# Patient Record
Sex: FEMALE | Race: BLACK OR AFRICAN AMERICAN | NOT HISPANIC OR LATINO | Employment: FULL TIME | URBAN - METROPOLITAN AREA
[De-identification: names, ages, dates, MRNs, and addresses within clinical notes are randomized per-mention and may not be internally consistent; named-entity substitution may affect disease eponyms.]

---

## 2021-12-03 ENCOUNTER — PRE-ADMISSION TESTING (OUTPATIENT)
Dept: PREADMISSION TESTING | Facility: HOSPITAL | Age: 41
End: 2021-12-03

## 2021-12-03 VITALS
TEMPERATURE: 98.1 F | HEART RATE: 83 BPM | RESPIRATION RATE: 16 BRPM | SYSTOLIC BLOOD PRESSURE: 109 MMHG | BODY MASS INDEX: 36.44 KG/M2 | WEIGHT: 198 LBS | DIASTOLIC BLOOD PRESSURE: 72 MMHG | HEIGHT: 62 IN | OXYGEN SATURATION: 99 %

## 2021-12-03 LAB
DEPRECATED RDW RBC AUTO: 34.7 FL (ref 37–54)
ERYTHROCYTE [DISTWIDTH] IN BLOOD BY AUTOMATED COUNT: 14.4 % (ref 12.3–15.4)
HCG SERPL QL: NEGATIVE
HCT VFR BLD AUTO: 38.3 % (ref 34–46.6)
HGB BLD-MCNC: 11.9 G/DL (ref 12–15.9)
MCH RBC QN AUTO: 21.8 PG (ref 26.6–33)
MCHC RBC AUTO-ENTMCNC: 31.1 G/DL (ref 31.5–35.7)
MCV RBC AUTO: 70.1 FL (ref 79–97)
PLATELET # BLD AUTO: 489 10*3/MM3 (ref 140–450)
PMV BLD AUTO: 9.3 FL (ref 6–12)
RBC # BLD AUTO: 5.46 10*6/MM3 (ref 3.77–5.28)
WBC NRBC COR # BLD: 7.58 10*3/MM3 (ref 3.4–10.8)

## 2021-12-03 PROCEDURE — 84703 CHORIONIC GONADOTROPIN ASSAY: CPT

## 2021-12-03 PROCEDURE — 85027 COMPLETE CBC AUTOMATED: CPT

## 2021-12-03 PROCEDURE — 36415 COLL VENOUS BLD VENIPUNCTURE: CPT

## 2021-12-03 NOTE — DISCHARGE INSTRUCTIONS
Take the following medications the morning of surgery:    NONE      ARRIVE AT 1:00      If you are on prescription narcotic pain medication to control your pain you may also take that medication the morning of surgery.    General Instructions:  • Do not eat solid food after midnight the night before surgery.  • You may drink clear liquids day of surgery but must stop at least one hour before your hospital arrival time.  • It is beneficial for you to have a clear drink that contains carbohydrates the day of surgery.  We suggest a 12 to 20 ounce bottle of Gatorade or Powerade for non-diabetic patients or a 12 to 20 ounce bottle of G2 or Powerade Zero for diabetic patients. (Pediatric patients, are not advised to drink a 12 to 20 ounce carbohydrate drink)    Clear liquids are liquids you can see through.  Nothing red in color.     Plain water                               Sports drinks  Sodas                                   Gelatin (Jell-O)  Fruit juices without pulp such as white grape juice and apple juice  Popsicles that contain no fruit or yogurt  Tea or coffee (no cream or milk added)  Gatorade / Powerade  G2 / Powerade Zero    • Infants may have breast milk up to four hours before surgery.  • Infants drinking formula may drink formula up to six hours before surgery.   • Patients who avoid smoking, chewing tobacco and alcohol for 4 weeks prior to surgery have a reduced risk of post-operative complications.  Quit smoking as many days before surgery as you can.  • Do not smoke, use chewing tobacco or drink alcohol the day of surgery.   • If applicable bring your C-PAP/ BI-PAP machine.  • Bring any papers given to you in the doctor’s office.  • Wear clean comfortable clothes.  • Do not wear contact lenses, false eyelashes or make-up.  Bring a case for your glasses.   • Bring crutches or walker if applicable.  • Remove all piercings.  Leave jewelry and any other valuables at home.  • Hair extensions with metal clips  must be removed prior to surgery.  • The Pre-Admission Testing nurse will instruct you to bring medications if unable to obtain an accurate list in Pre-Admission Testing.            Preventing a Surgical Site Infection:  • For 2 to 3 days before surgery, avoid shaving with a razor because the razor can irritate skin and make it easier to develop an infection.    • Any areas of open skin can increase the risk of a post-operative wound infection by allowing bacteria to enter and travel throughout the body.  Notify your surgeon if you have any skin wounds / rashes even if it is not near the expected surgical site.  The area will need assessed to determine if surgery should be delayed until it is healed.  • The night prior to surgery shower using a fresh bar of anti-bacterial soap (such as Dial) and clean washcloth.  Sleep in a clean bed with clean clothing.  Do not allow pets to sleep with you.  • Shower on the morning of surgery using a fresh bar of anti-bacterial soap (such as Dial) and clean washcloth.  Dry with a clean towel and dress in clean clothing.  • Ask your surgeon if you will be receiving antibiotics prior to surgery.  • Make sure you, your family, and all healthcare providers clean their hands with soap and water or an alcohol based hand  before caring for you or your wound.    Day of surgery:  Your arrival time is approximately two hours before your scheduled surgery time.  Upon arrival, a Pre-op nurse and Anesthesiologist will review your health history, obtain vital signs, and answer questions you may have.  The only belongings needed at this time will be a list of your home medications and if applicable your C-PAP/BI-PAP machine.  A Pre-op nurse will start an IV and you may receive medication in preparation for surgery, including something to help you relax.     Please be aware that surgery does come with discomfort.  We want to make every effort to control your discomfort so please discuss  any uncontrolled symptoms with your nurse.   Your doctor will most likely have prescribed pain medications.      If you are going home after surgery you will receive individualized written care instructions before being discharged.  A responsible adult must drive you to and from the hospital on the day of your surgery and stay with you for 24 hours.  Discharge prescriptions can be filled by the hospital pharmacy during regular pharmacy hours.  If you are having surgery late in the day/evening your prescription may be e-prescribed to your pharmacy.  Please verify your pharmacy hours or chose a 24 hour pharmacy to avoid not having access to your prescription because your pharmacy has closed for the day.    If you are staying overnight following surgery, you will be transported to your hospital room following the recovery period.  [facility] has all private rooms.    If you have any questions please call Pre-Admission Testing at [phone].  Deductibles and co-payments are collected on the day of service. Please be prepared to pay the required co-pay, deductible or deposit on the day of service as defined by your plan.    Patient Education for Self-Quarantine Process    • Following your COVID testing, we strongly recommend that you wear a mask when you are with other people and practice social distancing.   • Limit your activities to only required outings.  • Wash your hands with soap and water frequently for at least 20 seconds.   • Avoid touching your eyes, nose and mouth with unwashed hands.  • Do not share anything - utensils, drinking glasses, food from the same bowl.   • Sanitize household surfaces daily. Include all high touch areas (door handles, light switches, phones, countertops, etc.)    Call your surgeon immediately if you experience any of the following symptoms:  • Sore Throat  • Shortness of Breath or difficulty breathing  • Cough  • Chills  • Body soreness or muscle  pain  • Headache  • Fever  • New loss of taste or smell  • Do not arrive for your surgery ill.  Your procedure will need to be rescheduled to another time.  You will need to call your physician before the day of surgery to avoid any unnecessary exposure to hospital staff as well as other patients.    Take the following medications the morning of surgery:      If you are on prescription narcotic pain medication to control your pain you may also take that medication the morning of surgery.    General Instructions:  • Do not eat solid food after midnight the night before surgery.  • You may drink clear liquids day of surgery but must stop at least one hour before your hospital arrival time.  • It is beneficial for you to have a clear drink that contains carbohydrates the day of surgery.  We suggest a 12 to 20 ounce bottle of Gatorade or Powerade for non-diabetic patients or a 12 to 20 ounce bottle of G2 or Powerade Zero for diabetic patients. (Pediatric patients, are not advised to drink a 12 to 20 ounce carbohydrate drink)    Clear liquids are liquids you can see through.  Nothing red in color.     Plain water                               Sports drinks  Sodas                                   Gelatin (Jell-O)  Fruit juices without pulp such as white grape juice and apple juice  Popsicles that contain no fruit or yogurt  Tea or coffee (no cream or milk added)  Gatorade / Powerade  G2 / Powerade Zero    •   • Patients who avoid smoking, chewing tobacco and alcohol for 4 weeks prior to surgery have a reduced risk of post-operative complications.  Quit smoking as many days before surgery as you can.  • Do not smoke, use chewing tobacco or drink alcohol the day of surgery.   • If applicable bring your C-PAP/ BI-PAP machine.  • Bring any papers given to you in the doctor’s office.  • Wear clean comfortable clothes.  • Do not wear contact lenses, false eyelashes or make-up.  Bring a case for your glasses.   • Bring crutches  or walker if applicable.  • Remove all piercings.  Leave jewelry and any other valuables at home.  • Hair extensions with metal clips must be removed prior to surgery.  • The Pre-Admission Testing nurse will instruct you to bring medications if unable to obtain an accurate list in Pre-Admission Testing.        If you were given a blood bank ID arm band remember to bring it with you the day of surgery.    Preventing a Surgical Site Infection:  • For 2 to 3 days before surgery, avoid shaving with a razor because the razor can irritate skin and make it easier to develop an infection.    • Any areas of open skin can increase the risk of a post-operative wound infection by allowing bacteria to enter and travel throughout the body.  Notify your surgeon if you have any skin wounds / rashes even if it is not near the expected surgical site.  The area will need assessed to determine if surgery should be delayed until it is healed.  • The night prior to surgery shower using a fresh bar of anti-bacterial soap (such as Dial) and clean washcloth.  Sleep in a clean bed with clean clothing.  Do not allow pets to sleep with you.  • Shower on the morning of surgery using a fresh bar of anti-bacterial soap (such as Dial) and clean washcloth.  Dry with a clean towel and dress in clean clothing.  • Ask your surgeon if you will be receiving antibiotics prior to surgery.  • Make sure you, your family, and all healthcare providers clean their hands with soap and water or an alcohol based hand  before caring for you or your wound.    Day of surgery:  Your arrival time is approximately two hours before your scheduled surgery time.  Upon arrival, a Pre-op nurse and Anesthesiologist will review your health history, obtain vital signs, and answer questions you may have.  The only belongings needed at this time will be a list of your home medications and if applicable your C-PAP/BI-PAP machine.  A Pre-op nurse will start an IV and you  may receive medication in preparation for surgery, including something to help you relax.     Please be aware that surgery does come with discomfort.  We want to make every effort to control your discomfort so please discuss any uncontrolled symptoms with your nurse.   Your doctor will most likely have prescribed pain medications.      If you are going home after surgery you will receive individualized written care instructions before being discharged.  A responsible adult must drive you to and from the hospital on the day of your surgery and stay with you for 24 hours.  Discharge prescriptions can be filled by the hospital pharmacy during regular pharmacy hours.  If you are having surgery late in the day/evening your prescription may be e-prescribed to your pharmacy.  Please verify your pharmacy hours or chose a 24 hour pharmacy to avoid not having access to your prescription because your pharmacy has closed for the day.    If you are staying overnight following surgery, you will be transported to your hospital room following the recovery period.  [facility] has all private rooms.    If you have any questions please call Pre-Admission Testing at [phone].  Deductibles and co-payments are collected on the day of service. Please be prepared to pay the required co-pay, deductible or deposit on the day of service as defined by your plan.    Patient Education for Self-Quarantine Process    • Following your COVID testing, we strongly recommend that you wear a mask when you are with other people and practice social distancing.   • Limit your activities to only required outings.  • Wash your hands with soap and water frequently for at least 20 seconds.   • Avoid touching your eyes, nose and mouth with unwashed hands.  • Do not share anything - utensils, drinking glasses, food from the same bowl.   • Sanitize household surfaces daily. Include all high touch areas (door handles, light switches, phones,  countertops, etc.)    Call your surgeon immediately if you experience any of the following symptoms:  • Sore Throat  • Shortness of Breath or difficulty breathing  • Cough  • Chills  • Body soreness or muscle pain  • Headache  • Fever  • New loss of taste or smell  • Do not arrive for your surgery ill.  Your procedure will need to be rescheduled to another time.  You will need to call your physician before the day of surgery to avoid any unnecessary exposure to hospital staff as well as other patients.

## 2021-12-06 ENCOUNTER — LAB (OUTPATIENT)
Dept: LAB | Facility: HOSPITAL | Age: 41
End: 2021-12-06

## 2021-12-06 LAB — SARS-COV-2 ORF1AB RESP QL NAA+PROBE: NOT DETECTED

## 2021-12-06 PROCEDURE — U0004 COV-19 TEST NON-CDC HGH THRU: HCPCS

## 2021-12-06 PROCEDURE — C9803 HOPD COVID-19 SPEC COLLECT: HCPCS

## 2021-12-07 ENCOUNTER — HOSPITAL ENCOUNTER (OUTPATIENT)
Facility: HOSPITAL | Age: 41
Setting detail: HOSPITAL OUTPATIENT SURGERY
Discharge: HOME | End: 2021-12-07
Attending: OBSTETRICS & GYNECOLOGY | Admitting: OBSTETRICS & GYNECOLOGY

## 2021-12-07 ENCOUNTER — ANESTHESIA (OUTPATIENT)
Dept: PERIOP | Facility: HOSPITAL | Age: 41
End: 2021-12-07

## 2021-12-07 ENCOUNTER — ANESTHESIA EVENT (OUTPATIENT)
Dept: PERIOP | Facility: HOSPITAL | Age: 41
End: 2021-12-07

## 2021-12-07 VITALS
TEMPERATURE: 97.6 F | HEART RATE: 76 BPM | SYSTOLIC BLOOD PRESSURE: 112 MMHG | OXYGEN SATURATION: 96 % | DIASTOLIC BLOOD PRESSURE: 68 MMHG | WEIGHT: 197.8 LBS | RESPIRATION RATE: 16 BRPM | HEIGHT: 62 IN | BODY MASS INDEX: 36.4 KG/M2

## 2021-12-07 DIAGNOSIS — Z98.890 POSTOPERATIVE STATE: Primary | ICD-10-CM

## 2021-12-07 PROCEDURE — C1781 MESH (IMPLANTABLE): HCPCS | Performed by: OBSTETRICS & GYNECOLOGY

## 2021-12-07 PROCEDURE — 25010000002 EPINEPHRINE PER 0.1 MG: Performed by: OBSTETRICS & GYNECOLOGY

## 2021-12-07 PROCEDURE — 0 CEFAZOLIN IN DEXTROSE 2-4 GM/100ML-% SOLUTION: Performed by: STUDENT IN AN ORGANIZED HEALTH CARE EDUCATION/TRAINING PROGRAM

## 2021-12-07 PROCEDURE — 25010000002 PROPOFOL 10 MG/ML EMULSION: Performed by: NURSE ANESTHETIST, CERTIFIED REGISTERED

## 2021-12-07 PROCEDURE — 25010000002 NEOSTIGMINE 5 MG/10ML SOLUTION: Performed by: NURSE ANESTHETIST, CERTIFIED REGISTERED

## 2021-12-07 PROCEDURE — 25010000002 MIDAZOLAM PER 1 MG: Performed by: ANESTHESIOLOGY

## 2021-12-07 PROCEDURE — 25010000002 FENTANYL CITRATE (PF) 50 MCG/ML SOLUTION: Performed by: NURSE ANESTHETIST, CERTIFIED REGISTERED

## 2021-12-07 PROCEDURE — 25010000002 HYDROMORPHONE PER 4 MG: Performed by: NURSE ANESTHETIST, CERTIFIED REGISTERED

## 2021-12-07 PROCEDURE — 25010000002 ONDANSETRON PER 1 MG: Performed by: NURSE ANESTHETIST, CERTIFIED REGISTERED

## 2021-12-07 PROCEDURE — 25010000002 DEXAMETHASONE PER 1 MG: Performed by: NURSE ANESTHETIST, CERTIFIED REGISTERED

## 2021-12-07 DEVICE — SINGLE INCISION SLING SYSTEM
Type: IMPLANTABLE DEVICE | Site: VAGINA | Status: FUNCTIONAL
Brand: ALTIS

## 2021-12-07 RX ORDER — EPHEDRINE SULFATE 50 MG/ML
INJECTION, SOLUTION INTRAVENOUS AS NEEDED
Status: DISCONTINUED | OUTPATIENT
Start: 2021-12-07 | End: 2021-12-07 | Stop reason: SURG

## 2021-12-07 RX ORDER — SODIUM CHLORIDE 0.9 % (FLUSH) 0.9 %
3-10 SYRINGE (ML) INJECTION AS NEEDED
Status: DISCONTINUED | OUTPATIENT
Start: 2021-12-07 | End: 2021-12-07 | Stop reason: HOSPADM

## 2021-12-07 RX ORDER — DEXAMETHASONE SODIUM PHOSPHATE 10 MG/ML
INJECTION INTRAMUSCULAR; INTRAVENOUS AS NEEDED
Status: DISCONTINUED | OUTPATIENT
Start: 2021-12-07 | End: 2021-12-07 | Stop reason: SURG

## 2021-12-07 RX ORDER — SENNOSIDES 8.6 MG
650 CAPSULE ORAL EVERY 8 HOURS PRN
Qty: 60 TABLET | Refills: 0 | Status: SHIPPED | OUTPATIENT
Start: 2021-12-07

## 2021-12-07 RX ORDER — LIDOCAINE HYDROCHLORIDE 10 MG/ML
0.5 INJECTION, SOLUTION EPIDURAL; INFILTRATION; INTRACAUDAL; PERINEURAL ONCE AS NEEDED
Status: DISCONTINUED | OUTPATIENT
Start: 2021-12-07 | End: 2021-12-07 | Stop reason: HOSPADM

## 2021-12-07 RX ORDER — HYDROMORPHONE HCL 110MG/55ML
PATIENT CONTROLLED ANALGESIA SYRINGE INTRAVENOUS AS NEEDED
Status: DISCONTINUED | OUTPATIENT
Start: 2021-12-07 | End: 2021-12-07 | Stop reason: SURG

## 2021-12-07 RX ORDER — HYDROCODONE BITARTRATE AND ACETAMINOPHEN 7.5; 325 MG/1; MG/1
1 TABLET ORAL ONCE AS NEEDED
Status: COMPLETED | OUTPATIENT
Start: 2021-12-07 | End: 2021-12-07

## 2021-12-07 RX ORDER — MAGNESIUM HYDROXIDE 1200 MG/15ML
LIQUID ORAL AS NEEDED
Status: DISCONTINUED | OUTPATIENT
Start: 2021-12-07 | End: 2021-12-07 | Stop reason: HOSPADM

## 2021-12-07 RX ORDER — PROMETHAZINE HYDROCHLORIDE 25 MG/1
25 TABLET ORAL ONCE AS NEEDED
Status: DISCONTINUED | OUTPATIENT
Start: 2021-12-07 | End: 2021-12-07 | Stop reason: HOSPADM

## 2021-12-07 RX ORDER — SODIUM CHLORIDE, SODIUM LACTATE, POTASSIUM CHLORIDE, CALCIUM CHLORIDE 600; 310; 30; 20 MG/100ML; MG/100ML; MG/100ML; MG/100ML
9 INJECTION, SOLUTION INTRAVENOUS CONTINUOUS
Status: DISCONTINUED | OUTPATIENT
Start: 2021-12-07 | End: 2021-12-07 | Stop reason: HOSPADM

## 2021-12-07 RX ORDER — LIDOCAINE HYDROCHLORIDE 20 MG/ML
INJECTION, SOLUTION INFILTRATION; PERINEURAL AS NEEDED
Status: DISCONTINUED | OUTPATIENT
Start: 2021-12-07 | End: 2021-12-07 | Stop reason: SURG

## 2021-12-07 RX ORDER — CEFAZOLIN SODIUM 2 G/100ML
2 INJECTION, SOLUTION INTRAVENOUS ONCE
Status: COMPLETED | OUTPATIENT
Start: 2021-12-07 | End: 2021-12-07

## 2021-12-07 RX ORDER — SODIUM CHLORIDE 0.9 % (FLUSH) 0.9 %
3 SYRINGE (ML) INJECTION EVERY 12 HOURS SCHEDULED
Status: DISCONTINUED | OUTPATIENT
Start: 2021-12-07 | End: 2021-12-07 | Stop reason: HOSPADM

## 2021-12-07 RX ORDER — ONDANSETRON 2 MG/ML
INJECTION INTRAMUSCULAR; INTRAVENOUS AS NEEDED
Status: DISCONTINUED | OUTPATIENT
Start: 2021-12-07 | End: 2021-12-07 | Stop reason: SURG

## 2021-12-07 RX ORDER — ONDANSETRON 2 MG/ML
4 INJECTION INTRAMUSCULAR; INTRAVENOUS ONCE AS NEEDED
Status: DISCONTINUED | OUTPATIENT
Start: 2021-12-07 | End: 2021-12-07 | Stop reason: HOSPADM

## 2021-12-07 RX ORDER — GLYCOPYRROLATE 0.2 MG/ML
INJECTION INTRAMUSCULAR; INTRAVENOUS AS NEEDED
Status: DISCONTINUED | OUTPATIENT
Start: 2021-12-07 | End: 2021-12-07 | Stop reason: SURG

## 2021-12-07 RX ORDER — SODIUM CHLORIDE 9 MG/ML
INJECTION, SOLUTION INTRAVENOUS AS NEEDED
Status: DISCONTINUED | OUTPATIENT
Start: 2021-12-07 | End: 2021-12-07 | Stop reason: HOSPADM

## 2021-12-07 RX ORDER — SCOLOPAMINE TRANSDERMAL SYSTEM 1 MG/1
1 PATCH, EXTENDED RELEASE TRANSDERMAL ONCE
Status: DISCONTINUED | OUTPATIENT
Start: 2021-12-07 | End: 2021-12-07 | Stop reason: HOSPADM

## 2021-12-07 RX ORDER — OXYCODONE AND ACETAMINOPHEN 7.5; 325 MG/1; MG/1
2 TABLET ORAL EVERY 4 HOURS PRN
Status: DISCONTINUED | OUTPATIENT
Start: 2021-12-07 | End: 2021-12-07 | Stop reason: HOSPADM

## 2021-12-07 RX ORDER — PROMETHAZINE HYDROCHLORIDE 25 MG/1
25 SUPPOSITORY RECTAL ONCE AS NEEDED
Status: DISCONTINUED | OUTPATIENT
Start: 2021-12-07 | End: 2021-12-07 | Stop reason: HOSPADM

## 2021-12-07 RX ORDER — IBUPROFEN 600 MG/1
600 TABLET ORAL EVERY 6 HOURS PRN
Qty: 60 TABLET | Refills: 0 | OUTPATIENT
Start: 2021-12-07 | End: 2022-10-23

## 2021-12-07 RX ORDER — MIDAZOLAM HYDROCHLORIDE 1 MG/ML
1 INJECTION INTRAMUSCULAR; INTRAVENOUS
Status: DISCONTINUED | OUTPATIENT
Start: 2021-12-07 | End: 2021-12-07 | Stop reason: HOSPADM

## 2021-12-07 RX ORDER — EPHEDRINE SULFATE 50 MG/ML
5 INJECTION, SOLUTION INTRAVENOUS ONCE AS NEEDED
Status: DISCONTINUED | OUTPATIENT
Start: 2021-12-07 | End: 2021-12-07 | Stop reason: HOSPADM

## 2021-12-07 RX ORDER — HYDROMORPHONE HYDROCHLORIDE 1 MG/ML
0.5 INJECTION, SOLUTION INTRAMUSCULAR; INTRAVENOUS; SUBCUTANEOUS
Status: DISCONTINUED | OUTPATIENT
Start: 2021-12-07 | End: 2021-12-07 | Stop reason: HOSPADM

## 2021-12-07 RX ORDER — SODIUM CHLORIDE 0.9 % (FLUSH) 0.9 %
10 SYRINGE (ML) INJECTION AS NEEDED
Status: DISCONTINUED | OUTPATIENT
Start: 2021-12-07 | End: 2021-12-07 | Stop reason: HOSPADM

## 2021-12-07 RX ORDER — FAMOTIDINE 10 MG/ML
20 INJECTION, SOLUTION INTRAVENOUS ONCE
Status: COMPLETED | OUTPATIENT
Start: 2021-12-07 | End: 2021-12-07

## 2021-12-07 RX ORDER — DIPHENHYDRAMINE HCL 25 MG
25 CAPSULE ORAL
Status: DISCONTINUED | OUTPATIENT
Start: 2021-12-07 | End: 2021-12-07 | Stop reason: HOSPADM

## 2021-12-07 RX ORDER — FENTANYL CITRATE 50 UG/ML
50 INJECTION, SOLUTION INTRAMUSCULAR; INTRAVENOUS
Status: DISCONTINUED | OUTPATIENT
Start: 2021-12-07 | End: 2021-12-07 | Stop reason: HOSPADM

## 2021-12-07 RX ORDER — OXYCODONE HYDROCHLORIDE 5 MG/1
5 TABLET ORAL EVERY 4 HOURS PRN
Qty: 5 TABLET | Refills: 0 | Status: SHIPPED | OUTPATIENT
Start: 2021-12-07

## 2021-12-07 RX ORDER — FLUMAZENIL 0.1 MG/ML
0.2 INJECTION INTRAVENOUS AS NEEDED
Status: DISCONTINUED | OUTPATIENT
Start: 2021-12-07 | End: 2021-12-07 | Stop reason: HOSPADM

## 2021-12-07 RX ORDER — ROCURONIUM BROMIDE 10 MG/ML
INJECTION, SOLUTION INTRAVENOUS AS NEEDED
Status: DISCONTINUED | OUTPATIENT
Start: 2021-12-07 | End: 2021-12-07 | Stop reason: SURG

## 2021-12-07 RX ORDER — HYDRALAZINE HYDROCHLORIDE 20 MG/ML
5 INJECTION INTRAMUSCULAR; INTRAVENOUS
Status: DISCONTINUED | OUTPATIENT
Start: 2021-12-07 | End: 2021-12-07 | Stop reason: HOSPADM

## 2021-12-07 RX ORDER — PROPOFOL 10 MG/ML
VIAL (ML) INTRAVENOUS AS NEEDED
Status: DISCONTINUED | OUTPATIENT
Start: 2021-12-07 | End: 2021-12-07 | Stop reason: SURG

## 2021-12-07 RX ORDER — FENTANYL CITRATE 50 UG/ML
INJECTION, SOLUTION INTRAMUSCULAR; INTRAVENOUS AS NEEDED
Status: DISCONTINUED | OUTPATIENT
Start: 2021-12-07 | End: 2021-12-07 | Stop reason: SURG

## 2021-12-07 RX ORDER — NEOSTIGMINE METHYLSULFATE 0.5 MG/ML
INJECTION, SOLUTION INTRAVENOUS AS NEEDED
Status: DISCONTINUED | OUTPATIENT
Start: 2021-12-07 | End: 2021-12-07 | Stop reason: SURG

## 2021-12-07 RX ORDER — NALOXONE HCL 0.4 MG/ML
0.2 VIAL (ML) INJECTION AS NEEDED
Status: DISCONTINUED | OUTPATIENT
Start: 2021-12-07 | End: 2021-12-07 | Stop reason: HOSPADM

## 2021-12-07 RX ORDER — LABETALOL HYDROCHLORIDE 5 MG/ML
5 INJECTION, SOLUTION INTRAVENOUS
Status: DISCONTINUED | OUTPATIENT
Start: 2021-12-07 | End: 2021-12-07 | Stop reason: HOSPADM

## 2021-12-07 RX ORDER — DIPHENHYDRAMINE HYDROCHLORIDE 50 MG/ML
12.5 INJECTION INTRAMUSCULAR; INTRAVENOUS
Status: DISCONTINUED | OUTPATIENT
Start: 2021-12-07 | End: 2021-12-07 | Stop reason: HOSPADM

## 2021-12-07 RX ADMIN — EPHEDRINE SULFATE 10 MG: 50 INJECTION INTRAVENOUS at 13:57

## 2021-12-07 RX ADMIN — FENTANYL CITRATE 25 MCG: 0.05 INJECTION, SOLUTION INTRAMUSCULAR; INTRAVENOUS at 14:00

## 2021-12-07 RX ADMIN — FAMOTIDINE 20 MG: 10 INJECTION INTRAVENOUS at 13:13

## 2021-12-07 RX ADMIN — FENTANYL CITRATE 50 MCG: 0.05 INJECTION, SOLUTION INTRAMUSCULAR; INTRAVENOUS at 13:35

## 2021-12-07 RX ADMIN — PROPOFOL 25 MCG/KG/MIN: 10 INJECTION, EMULSION INTRAVENOUS at 13:35

## 2021-12-07 RX ADMIN — MIDAZOLAM 1 MG: 1 INJECTION INTRAMUSCULAR; INTRAVENOUS at 13:20

## 2021-12-07 RX ADMIN — GLYCOPYRROLATE 0.4 MG: 0.2 INJECTION INTRAMUSCULAR; INTRAVENOUS at 14:23

## 2021-12-07 RX ADMIN — PROPOFOL 200 MG: 10 INJECTION, EMULSION INTRAVENOUS at 13:31

## 2021-12-07 RX ADMIN — FENTANYL CITRATE 50 MCG: 0.05 INJECTION, SOLUTION INTRAMUSCULAR; INTRAVENOUS at 14:03

## 2021-12-07 RX ADMIN — FENTANYL CITRATE 25 MCG: 0.05 INJECTION, SOLUTION INTRAMUSCULAR; INTRAVENOUS at 13:39

## 2021-12-07 RX ADMIN — SODIUM CHLORIDE, POTASSIUM CHLORIDE, SODIUM LACTATE AND CALCIUM CHLORIDE 9 ML/HR: 600; 310; 30; 20 INJECTION, SOLUTION INTRAVENOUS at 13:13

## 2021-12-07 RX ADMIN — FENTANYL CITRATE 25 MCG: 0.05 INJECTION, SOLUTION INTRAMUSCULAR; INTRAVENOUS at 14:09

## 2021-12-07 RX ADMIN — SCOPALAMINE 1 PATCH: 1 PATCH, EXTENDED RELEASE TRANSDERMAL at 13:13

## 2021-12-07 RX ADMIN — LIDOCAINE HYDROCHLORIDE 100 MG: 20 INJECTION, SOLUTION INFILTRATION; PERINEURAL at 13:31

## 2021-12-07 RX ADMIN — CEFAZOLIN SODIUM 2 G: 2 INJECTION, SOLUTION INTRAVENOUS at 13:20

## 2021-12-07 RX ADMIN — ONDANSETRON 4 MG: 2 INJECTION INTRAMUSCULAR; INTRAVENOUS at 14:23

## 2021-12-07 RX ADMIN — HYDROCODONE BITARTRATE AND ACETAMINOPHEN 1 TABLET: 7.5; 325 TABLET ORAL at 15:25

## 2021-12-07 RX ADMIN — NEOSTIGMINE METHYLSULFATE 2.5 MG: 0.5 INJECTION INTRAVENOUS at 14:23

## 2021-12-07 RX ADMIN — HYDROMORPHONE HYDROCHLORIDE 0.5 MG: 2 INJECTION, SOLUTION INTRAMUSCULAR; INTRAVENOUS; SUBCUTANEOUS at 14:38

## 2021-12-07 RX ADMIN — HYDROMORPHONE HYDROCHLORIDE 0.5 MG: 2 INJECTION, SOLUTION INTRAMUSCULAR; INTRAVENOUS; SUBCUTANEOUS at 14:26

## 2021-12-07 RX ADMIN — ROCURONIUM BROMIDE 40 MG: 50 INJECTION INTRAVENOUS at 13:31

## 2021-12-07 RX ADMIN — FENTANYL CITRATE 25 MCG: 0.05 INJECTION, SOLUTION INTRAMUSCULAR; INTRAVENOUS at 14:26

## 2021-12-07 RX ADMIN — DEXAMETHASONE SODIUM PHOSPHATE 8 MG: 10 INJECTION INTRAMUSCULAR; INTRAVENOUS at 13:35

## 2021-12-07 NOTE — ANESTHESIA PROCEDURE NOTES
Airway  Urgency: elective    Date/Time: 12/7/2021 1:33 PM  Airway not difficult    General Information and Staff    Patient location during procedure: OR  Anesthesiologist: Rody Pringle MD  CRNA: Nadine Acuña CRNA    Indications and Patient Condition  Indications for airway management: airway protection    Preoxygenated: yes  Mask difficulty assessment: 1 - vent by mask    Final Airway Details  Final airway type: endotracheal airway      Successful airway: ETT  Cuffed: yes   Successful intubation technique: direct laryngoscopy  Facilitating devices/methods: intubating stylet  Endotracheal tube insertion site: oral  Blade: César  Blade size: 3  ETT size (mm): 7.0  Cormack-Lehane Classification: grade I - full view of glottis  Placement verified by: chest auscultation and capnometry   Measured from: lips  ETT/EBT  to lips (cm): 22  Number of attempts at approach: 1  Assessment: lips, teeth, and gum same as pre-op and atraumatic intubation

## 2021-12-07 NOTE — ANESTHESIA POSTPROCEDURE EVALUATION
Patient: Franchesca Voss    Procedure Summary     Date: 12/07/21 Room / Location: [facility] OR  / [facility] MAIN OR    Anesthesia Start: 1324 Anesthesia Stop: 1448    Procedure: MINI SLING; CYSTOSCOPY (N/A Vagina) Diagnosis:     Surgeons: Jairo Linares MD Provider: Rody Pringle MD    Anesthesia Type: general ASA Status: 2          Anesthesia Type: general    Vitals  Vitals Value Taken Time   /82 12/07/21 1531   Temp 36.4 °C (97.6 °F) 12/07/21 1530   Pulse 53 12/07/21 1542   Resp 18 12/07/21 1530   SpO2 100 % 12/07/21 1542   Vitals shown include unvalidated device data.        Post Anesthesia Care and Evaluation    Patient location during evaluation: PACU  Patient participation: complete - patient participated  Level of consciousness: awake and alert  Pain management: adequate  Airway patency: patent  Anesthetic complications: No anesthetic complications    Cardiovascular status: acceptable  Respiratory status: acceptable  Hydration status: acceptable    Comments: ---------------------------               12/07/21                      1530         ---------------------------   BP:          114/82         Pulse:       (!) 40         Resp:          18           Temp:   36.4 °C (97.6 °F)   SpO2:          98%         ---------------------------

## 2021-12-07 NOTE — OP NOTE
OPERATIVE REPORT    Operation: Single incision Mid-urethral sling, Cystourethroscopy     Indication for Surgery: Stress Urinary Incontinence    Preoperative Diagnosis: Stress Urinary Incontinence    Postoperative Diagnosis: Stress Urinary Incontinence    Surgeon(s)  Attending: Dr. Vijay MD  Fellow: Dr. Phan DO    Anesthesia: GETA    Antibiotics: Ancef    Estimated Blood Loss  20mL    Urine Output  300 mL    Findings  Normal appearing bladder mucosa with bilateral ureteral efflux, without injury or lesion     Implant: Atlis Single incision Sling     Specimen(s)  None    Complications  None    Description of Procedure:     The bladder was drained of urine using the Ayon catheter placement. Two Allis' were placed at 1 cm proximal to the urethral meatus and at the urethro-vesicular junction framing the mid-urethra, and a Ayon catheter was inserted into the bladder. Both lateral sulci were injected with 60 ml of the epinephrine solution and a midline incision was made in the vaginal mucosa over the mid-urethra with a #15 blade scalpel. Bilateral tunnels were made under the mucosa toward the pubic rami until the pubic bone was reached. The Atlis sling trocar for the patient's right was were then placed in the right vaginal dissection site and advanced to place the sling in the obturator membrane, and the trocar was withdrawn. The Atlis sling for the left was then attached to the trocar and advanced into the left vaginal dissection to place the sling in the left obturator membrane, and the trocar was withdrawn. The vaginal sulci were checked to ensure no injury to the vaginal mucosa. The suture to tension the mesh on the left side was then put on traction until the mesh was laying flat against the urethra but not compressing it, and the suture was trimmed as far lateral as possible. The mesh over the urethra was seen to be laying flat and loose enough not to exert pressure on urethra at rest. The vaginal incision  was copiously irrigated with sterile fluid and wound was found to be hemostatic. Incision was closed with 2-0 Vicryl. A cystoscopy was performed using a 70-degree cystoscopy. No bladder perforations or irregularities were seen as noted above.    All sponge, needle, and instrument counts were noted to be correct x2 at the end of the procedure. The patient tolerated the procedure well. The patient was taken to the recovery room extubated in stable condition.     Dr. Jorge was the attending and was scrubbed and participated in the entirety of the procedure.    Michelle Chisholm DO   [facility] Fellow

## 2021-12-07 NOTE — H&P
[facility]   PREOPERATIVE HISTORY AND PHYSICAL    Patient Name:Franchesca Voss  : 1980  MRN: [id number]  Primary Care Physician: System, Provider Not In  Date of admission: 2021    Subjective   Subjective     Chief Complaint: preoperative evaluation    History of Present Illness  Franchesca Voss is a 41 y.o. female who is scheduled for MINI SLING CYSTOSCOPY (N/A)    Franchesca Voss is a 41 y.o.  female scheduled for a mini-sling and cystoscopy on 21 at [facility]  Medical history is significant for anemia, asthma, obesity.  PPD smoker.   Surgical history is significant for laparoscopic bilateral salpingectomy 2020 for contraception.     Other Clearance:  n/a     Review of Systems   All other systems reviewed and are negative.       Personal History     Past Medical History:   Diagnosis Date   • Clinical diagnosis of COVID-19 2021   • History of transfusion 2012    CHILDBIRTH   • Spinal headache 2016    BLOOD PATCH DONE AFTER EPIDURAL   • Urinary incontinence        Past Surgical History:   Procedure Laterality Date   • TUBAL ABDOMINAL LIGATION  2021       Family History: Her family history is not on file.     Social History: She  reports that she has been smoking cigarettes. She has a 13.00 pack-year smoking history. She does not have any smokeless tobacco history on file. She reports that she does not drink alcohol and does not use drugs.    Home Medications:       Allergies:  She has No Known Allergies.    Objective    Objective     Vitals:         Physical Exam     CONSTITUTIONAL: Well developed.  Well nourished.   HEENT: Normocephalic, atraumatic. Anicteric sclera.    RESPIRATORY: Normal respiratory effort.   CARDIOVASCULAR: Regular rate.   ABDOMINAL: Abdomen, soft and nontender.   MUSCULOSKELETAL: No deformities/weakness. Moves all extremities well.   NEUROLOGIC: Grossly normal   PSYCHIATRIC: Oriented X 3.     Assessment/Plan   Assessment / Plan     Brief Patient  Summary:  Franchesca Voss is a 41 y.o.  female with stress urinary incontinence.      PLAN:   Patient scheduled for mini-sling and cystoscopy.   -Today she was allowed the opportunity to ask and discuss all of her questions and concerns related to before, during, and after surgery.  -Post operative expectations and restrictions reviewed in detail. Handout provided to patient to take home for reference.  -We discussed surgical risks, which include, but are not limited to, pain, bleeding, infection, injury to nearby organs including bowel, bladder, ureters, urethra, nerves and blood vessels, as well as the possibility of persistent or recurrent symptoms which may require future surgery.   -We discussed additional surgical risks of possible large abdominal incision, prolonged hospitalization, prolonged catheterization, deep vein thrombosis, and death.  -We further discussed the risk of postoperative dyspareunia or pelvic pain, and the potential for postoperative voiding dysfunction. Specifically, we discussed the risk of persistent or recurrent postoperative incontinence, or alternately postoperative urinary retention, either of which may require future surgical correction. Some pelvic surgeries have a risk of severe postoperative pelvic pain that can last several weeks for which may require removal of the suture or undoing of the surgery.   -Discussed the possibility of need for gonzales catheter post operatively if the patient is unable to void completely after surgery.   -If post operative voiding dysfunction after sling does not improve she may need urethrolysis or possible take down of the sling.  -With respect to permanent polypropylene mesh placement, we reviewed the recent FDA communications and the ~4% risk of a mesh-related complication related to this procedure, which could include mesh erosion into the vagina, bladder, urethra, ureters, or bowel, or alternately pelvic pain or dyspareunia, which may  require future surgery. Surgery for any complications may or may not correct the complication. The patient was amenable to these risks, and wishes to proceed.   -Bowel function is unpredictable postoperatively; surgery may help, not change, or even worsen any present symptoms. In the setting of prior constipation, we recommend starting a bowel regimen prior to surgery.  -Consents were signed. She is to meet with PAT for anesthesia counseling and consent.      Active Hospital Problems:  There are no active hospital problems to display for this patient.    Plan:   Procedure(s):  MINI SLING CYSTOSCOPY    Michelle Chisholm DO

## 2021-12-07 NOTE — ANESTHESIA PREPROCEDURE EVALUATION
Anesthesia Evaluation     history of anesthetic complications: PONV  NPO Solid Status: > 8 hours  NPO Liquid Status: > 2 hours           Airway   Mallampati: II  TM distance: >3 FB  Neck ROM: full  Dental - normal exam     Pulmonary - normal exam   (+) a smoker (1/2 ppd) Current,   Cardiovascular - negative cardio ROS and normal exam  Exercise tolerance: good (4-7 METS)        Neuro/Psych  (+) headaches,     GI/Hepatic/Renal/Endo    (+) morbid obesity,      Musculoskeletal (-) negative ROS    Abdominal    Substance History      OB/GYN          Other - negative ROS                       Anesthesia Plan    ASA 2     general     intravenous induction     Anesthetic plan, all risks, benefits, and alternatives have been provided, discussed and informed consent has been obtained with: patient.

## 2021-12-07 NOTE — DISCHARGE INSTRUCTIONS
YOU HAD A PAIN PILL AT 3:25 TODAY.        Female Pelvic Medicine & Reconstructive Surgery (Urogynecology)     POST OPERATIVE INSTRUCTIONS & RECOVERY    You may have had reconstructive pelvic surgery and it is now your turn to play an important role in its success. We know that it will take about 12 weeks for the tissues that have been operated on to heal to 80% of their final strength. Then it may take months or even up to two years for this healing to be complete. Major risk factors for tearing down the surgical repair in the first weeks or months are constipation and heavy lifting. Our recommendations below are to try to prevent this from happening.    The staff at [facility] is committed to ensuring that your post-operative experience is as comfortable as possible. Please do not hesitate to call the office for any questions after recovery. Any questions regarding your surgery or post-operative recovery should be directed to the staff at [facility] rather than your Primary Care Physician (PCP). The following information will help answer the frequently asked questions and will help you understand some of the common experiences that may occur after your surgery.    What should I expect immediately after surgery?    Activity  • Take it easy for the first few weeks after surgery; you may need assistance the first few days. Some people notice slight depression after surgery. This will resolve on its own, but please call us if it does not.  • You may walk as much as you would like and go up and down stairs, although in the beginning you may need to take one step at a time. Avoid jogging, aerobics, swimming, and biking. Avoid heavy pushing or pulling, including vacuuming and lifting pets/children/grandchildren.  • You can return to work 4 to 6 weeks after surgery depending on your requirements for lifting and standing.   • Heavy lifting is a major risk factor for tearing down your repair. You should not  lift anything heavier than a gallon of milk (8 to 9 pounds) for the first six weeks. You may gradually increase your lifting to 20 pounds six to twelve weeks after surgery.  • You may drive your car as soon as you feel ready and can safely react/turn your body, but not while you are taking narcotic medication.    Nutrition  • Resume your normal diet after surgery. Eat a well-balanced diet. Drink six to eight glasses of non-caffeinated beverages daily. Water is especially recommended.   • Resume all of your prior medications once you are home unless otherwise instructed by your doctor.     Abdominal incision care  • If you have an abdominal incision, clean the incision with water. Incisions for laparoscopy are usually ¼ to ½ inch in size. You may have 2 to 3 small incisions with one larger ¾ inch incision OR you may only have 3 to 4 small incisions.   • The incisions will have absorbent sutures, steri-strips, band-aids and/or skin glue. The band-aids may be removed the day after surgery. The steri-strips can be kept on until they curl at the edges. The sutures will absorb by themselves. If your skin is closed with staples they must be removed 7-10 days after surgery. Call the office to schedule an appointment for this procedure.    Hygiene, Abdominal incision care, and Vaginal discharge  • Shower as usual. If you have an abdominal incision, clean the incision with water. Do not bathe in the bathtub until you have been given permission, usually after you see your surgeon at your 6-week post-op appointment. Keep the incision area clean and dry. Do not use a dressing unless your incision is draining or irritated.  • You may notice an abnormal discharge or drainage. It is normal for discharge to go from red to pink to yellow as your sutures dissolve. If the discharge is foul-smelling please call our office.  • Keep the perineal area (between the vagina and the rectum) clean and dry. Clean after every bowel movement and  each time you urinate. Rinsing with plain water or a sitz-bath may be helpful.  • Use maxi pads - not tampons - to absorb blood or discharge. Avoid douching. You may notice vaginal spotting for up to 6 weeks. If you have bleeding that is heavy enough to soak through two maxi pads for two hours in a row call our office. Keep track of when the bleeding began and how many pads you have used.    Bowel Movements  • Constipation can cause severe pain that can get worse with increased amounts of medication. Narcotics can make you constipated. Although we recognize that, depending on the extent of your surgery you may need to use narcotics, please be aware that they can cause constipation.  • Constipation is also a major risk factor in tearing down of the surgical repair. We want to avoid constipation. Preventing constipation is much easier than treating it once it happens.  • Do not strain during bowel movements.  • If you experience constipation, make sure you are drinking plenty of water and eating a high fiber diet. A high fiber diet includes fruits, vegetables, and bran. Your doctor may have also recommended a fiber supplement like Benefiber, Metamucil, Citrucel, or Fibercon. Take this as directed once you are home from the hospital.  • If you commonly experience constipation,  over-the-counter MiraLAX (you can use a generic version of polyethylene glycol as well). Please take this daily. If your stool gets too loose while using MiraLAX you can decrease using the medication every other day or every third day. Adjust the MiraLAX dose according to what your need for a regular bowel movement without straining.  • If you start to feel constipated you can take MiraLAX once in the morning and once at night until you have your first bowel movement. Alternatively, you may also use a mild laxative such as Milk of Magnesia or a stool softener such as Colace®. In addition, make sure that you are getting enough activity  during the day. No prescription is required for these medications.     Voiding / Urination  • Empty your bladder every two to three hours while you are awake, whether you feel like you need to or not. Sometimes surgery can change your sensation of fullness and you may not feel like you need to void. We want to avoid your bladder becoming too full.  • If you have any special bladder instructions, you will be given them in the hospital. If you are unsure about these instructions call the office.  • Call the office if you begin to experience any bladder problems. These problems may include urgency, frequency or pain with urination.     Sexual Pey[place]  • You should not have intercourse for a minimum of 6 weeks after surgery. Your doctor will tell you when you can resume sexual intercourse at your post-op visit.  • You should not put anything in the vagina for six weeks after surgery. The only exceptions are if your doctor told you to use vaginal estrogen cream or pills. If you are using vaginal estrogen cream or pills, you can resume using those when you get home.    Fever  • If you feel feverish or having shaking chills, take your temperature.  • If your temperature is 100.4°F twice in a row, or if your temperature is 101°F or higher one time, call our office. This may mean that you have an infection and we will want to know about that.    Pain control  • Most women will require pain medication after surgery. If you are comfortable you will be a little more active, which will help recovery.  • Follow your doctor's instructions regarding pain medication. Because the narcotics can cause constipation, we encourage you to take over the counter medications like Tylenol (generic name: acetaminophen) or ibuprofen when you are able. If you are unsure if you are able to take these medications then ask your doctor about using them before your use it. Make sure that you do not take more than the recommended daily limit.    • You may use a heating pad on your abdomen to relieve gas pain.    Please notify our office if any of the following occurs:    • Increased pain, redness, discharge, hardness, or swelling at the incision.  • Pain or burning with urination  • Vaginal bleeding that soaks more than 1 maxi pad per hour for two hours in a row  • Fever greater than 101°F   • Persistent nausea, vomiting or inability to have bowel movements.  • Shortness of breath, calf pain or swelling in your extremities    Please call the office to schedule your post-operative visit for about 6 weeks after your surgery. You may also see your doctor again about 3 to 6 months and/or 1 year after your surgery. Additional appointments can and will be made based upon need.     **If you leave the hospital with a bladder catheter in place call the office to schedule an appointment to take place 1 week after your surgery **    Please call with any questions or concerns.   [phone] Monday to Friday 8AM to 5PM  or [phone] after work hours and during holidays    Dr. Jairo Horner

## 2022-10-23 ENCOUNTER — APPOINTMENT (OUTPATIENT)
Dept: GENERAL RADIOLOGY | Facility: HOSPITAL | Age: 42
End: 2022-10-23

## 2022-10-23 ENCOUNTER — HOSPITAL ENCOUNTER (EMERGENCY)
Facility: HOSPITAL | Age: 42
Discharge: HOME | End: 2022-10-23
Attending: EMERGENCY MEDICINE | Admitting: EMERGENCY MEDICINE

## 2022-10-23 VITALS
HEART RATE: 80 BPM | WEIGHT: 171 LBS | OXYGEN SATURATION: 100 % | TEMPERATURE: 97.9 F | SYSTOLIC BLOOD PRESSURE: 118 MMHG | DIASTOLIC BLOOD PRESSURE: 88 MMHG | HEIGHT: 62 IN | RESPIRATION RATE: 17 BRPM | BODY MASS INDEX: 31.47 KG/M2

## 2022-10-23 DIAGNOSIS — M79.672 LEFT FOOT PAIN: Primary | ICD-10-CM

## 2022-10-23 DIAGNOSIS — S97.82XA CRUSHING INJURY OF LEFT FOOT, INITIAL ENCOUNTER: ICD-10-CM

## 2022-10-23 PROCEDURE — 99283 EMERGENCY DEPT VISIT LOW MDM: CPT

## 2022-10-23 PROCEDURE — 73630 X-RAY EXAM OF FOOT: CPT

## 2022-10-23 RX ORDER — ACETAMINOPHEN 500 MG
1000 TABLET ORAL ONCE
Status: COMPLETED | OUTPATIENT
Start: 2022-10-23 | End: 2022-10-23

## 2022-10-23 RX ORDER — IBUPROFEN 600 MG/1
600 TABLET ORAL EVERY 8 HOURS PRN
Qty: 21 TABLET | Refills: 0 | Status: SHIPPED | OUTPATIENT
Start: 2022-10-23

## 2022-10-23 RX ADMIN — ACETAMINOPHEN 1000 MG: 500 TABLET ORAL at 17:24

## 2022-10-23 NOTE — ED NOTES
Pt complains of left foot pain, states she was at work when one her patient motorized wheelchair hit her foot and pinned her against wall, localized swelling/tenderness to left foot noted, pedal pulses present 2+ pt denies any other s/s at this time, pt a/o x 4 at this time      PPE per protocol utilized throughout entire patient encounter.

## 2022-10-23 NOTE — ED PROVIDER NOTES
EMERGENCY DEPARTMENT ENCOUNTER    Room Number:  37/37  Date of encounter:  10/23/2022  PCP: System, Provider Not In  Patient Care Team:  System, Provider Not In as PCP - General   Historian: Patient    HPI:  Chief Complaint: Left foot pain  A complete HPI/ROS/PMH/PSH/SH/FH are unobtainable due to: Nothing    Context: Franchesca Voss is a 42 y.o. female who presents to the ED c/o left foot pain.  She reports that she was at work at 10:00 this morning when a power wheelchair rolled over her left foot and stopped.  She reports that she had pain to the top of her left foot.  She has not taken any medicine for her symptoms.  Walking makes it worse.  Immobilization makes it better.  She denies open wounds.  She states she thought that it was not broken so she tried to continue working but this afternoon the pain persisted.  She has never had similar episodes.  She states the pain radiates downwards towards her toes.    Prior record review: General surgery note dated 12/7/2021 for a mid urethral sling    PAST MEDICAL HISTORY  Active Ambulatory Problems     Diagnosis Date Noted   • No Active Ambulatory Problems     Resolved Ambulatory Problems     Diagnosis Date Noted   • No Resolved Ambulatory Problems     Past Medical History:   Diagnosis Date   • Clinical diagnosis of COVID-19 09/2021   • History of transfusion 2012   • Spinal headache 2016   • Urinary incontinence        The patient qualifies to receive the vaccine, but they have not yet received it.    PAST SURGICAL HISTORY  Past Surgical History:   Procedure Laterality Date   • PUBOVAGINAL SLING N/A 12/7/2021    Procedure: MINI SLING; CYSTOSCOPY;  Surgeon: Jairo Linares MD;  Location: [facility];  Service: Gynecology;  Laterality: N/A;   • TUBAL ABDOMINAL LIGATION  02/2021         FAMILY HISTORY  Family History   Problem Relation Age of Onset   • Malig Hyperthermia Neg Hx          SOCIAL HISTORY  Social History     Socioeconomic History   • Marital status:     Tobacco Use   • Smoking status: Every Day     Packs/day: 0.50     Years: 26.00     Pack years: 13.00     Types: Cigarettes   Vaping Use   • Vaping Use: Former   • Substances: Nicotine   • Devices: Disposable   Substance and Sexual Activity   • Alcohol use: Never   • Drug use: Never   • Sexual activity: Defer         ALLERGIES  Patient has no known allergies.        REVIEW OF SYSTEMS  Review of Systems   No chest pain, no shortness of breath, no nausea, no vomiting, fever, no chills, no focal weakness, no focal numbness  All systems reviewed and negative except for those discussed in HPI.       PHYSICAL EXAM    I have reviewed the triage vital signs and nursing notes.    ED Triage Vitals [10/23/22 1526]   Temp Heart Rate Resp BP SpO2   97.9 °F (36.6 °C) 116 17 146/96 98 %      Temp src Heart Rate Source Patient Position BP Location FiO2 (%)   -- -- -- -- --       Physical Exam  GENERAL: Awake, alert, no acute distress  SKIN: Warm, dry  HENT: Normocephalic, atraumatic  EYES: no scleral icterus  CV: regular rhythm, regular rate  RESPIRATORY: normal effort, lungs clear  ABDOMEN: soft, nontender, nondistended  MUSCULOSKELETAL: no deformity.  Tenderness to the top of the left foot.  No symptoms to the ankle.  No tenderness to the toes or distal foot.  Pulses intact.  Sensation and motor intact.  No open wounds.  NEURO: alert, moves all extremities, follows commands          LAB RESULTS  No results found for this or any previous visit (from the past 24 hour(s)).    Ordered the above labs and independently reviewed the results.        RADIOLOGY  XR Foot 3+ View Left    Result Date: 10/23/2022  Emergency 3 views of the left foot on 10/23/2022  CLINICAL HISTORY: Patient states she was at work and an electric wheelchair backed up over her left foot and now complains of left foot pain.  FINDINGS: 3 views including AP oblique and lateral views left foot are submitted for interpretation. I see no radiographic evidence  of acute fracture or malalignment or acute osseous abnormality in the bones of the left foot. 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  This report was finalized on 10/23/2022 6:44 PM by Dr. Thad Leonardo M.D.        I ordered the above noted radiological studies. Reviewed by me and discussed with radiologist.  See dictation for official radiology interpretation.      PROCEDURES    Procedures      MEDICATIONS GIVEN IN ER    Medications   acetaminophen (TYLENOL) tablet 1,000 mg (1,000 mg Oral Given 10/23/22 1724)         PROGRESS, DATA ANALYSIS, CONSULTS, AND MEDICAL DECISION MAKING    All labs have been independently reviewed by me.  All radiology studies have been reviewed by me and discussed with radiologist dictating the report.   EKG's independently viewed and interpreted by me.  Discussion below represents my analysis of pertinent findings related to patient's condition, differential diagnosis, treatment plan and final disposition.    Differential diagnosis includes but is not limited to foot fracture, foot dislocation, abrasion, contusion, crush injury.    ED Course as of 10/23/22 1852   Sun Oct 23, 2022   1850 I reviewed work-up and findings with the patient the bedside.  Answered all questions.  She has no fracture.  I offered crutches and a cast shoe but she requests a walking boot instead. [TR]      ED Course User Index  [TR] Haile Russell MD           PPE: The patient wore a mask and I wore an N95 mask throughout the entire patient encounter.       AS OF 18:52 EDT VITALS:    BP - 111/80  HR - 79  TEMP - 97.9 °F (36.6 °C)  O2 SATS - 100%        DIAGNOSIS  Final diagnoses:   Left foot pain   Crushing injury of left foot, initial encounter         DISPOSITION  ED Disposition     ED Disposition   Discharge    Condition   Stable    Comment   --                Note Disclaimer: At [facility], we  believe that sharing information builds trust and better relationships. You are receiving this note because you recently visited [facility]. It is possible you will see health information before a provider has talked with you about it. This kind of information can be easy to misunderstand. To help you fully understand what it means for your health, we urge you to discuss this note with your provider.       Haile Russell MD  10/23/22 9422

## 2022-10-23 NOTE — DISCHARGE INSTRUCTIONS
Take Tylenol and ibuprofen to help with your pain.  Return here immediately for any fevers over 100.4, increased pain.

## 2022-10-23 NOTE — ED NOTES
Pt to er via pv with c/o left foot pain. Pt states she was at work and an electric wheelchair back up on her left foot. Pt ambulatory in triage but states its hard for her to bear weight.     Pt and RN wearing mask throughout encounter.

## (undated) DEVICE — SYRINGE, LUER LOCK, 60ML: Brand: MEDLINE

## (undated) DEVICE — DRAPE,UNDERBUTTOCKS,PCH,STERILE: Brand: MEDLINE

## (undated) DEVICE — PK HYST VAG 40

## (undated) DEVICE — NEEDLE, QUINCKE, 18GX3.5": Brand: MEDLINE

## (undated) DEVICE — SUT VIC 2/0 CT2 27IN J269H

## (undated) DEVICE — SKIN PREP TRAY W/CHG: Brand: MEDLINE INDUSTRIES, INC.

## (undated) DEVICE — CATHETER,URETHRAL,REDRUBBER,STRL,16FR: Brand: MEDLINE

## (undated) DEVICE — GLV SURG PREMIERPRO ORTHO LTX PF SZ8 BRN

## (undated) DEVICE — COVER,TABLE,44X90,STERILE: Brand: MEDLINE

## (undated) DEVICE — SUT MNCRYL PLS ANTIB UD 4/0 PS2 18IN